# Patient Record
Sex: MALE | Race: WHITE | NOT HISPANIC OR LATINO | URBAN - METROPOLITAN AREA
[De-identification: names, ages, dates, MRNs, and addresses within clinical notes are randomized per-mention and may not be internally consistent; named-entity substitution may affect disease eponyms.]

---

## 2017-09-15 ENCOUNTER — OUTPATIENT (OUTPATIENT)
Dept: OUTPATIENT SERVICES | Facility: HOSPITAL | Age: 72
LOS: 1 days | End: 2017-09-15

## 2017-09-29 ENCOUNTER — OUTPATIENT (OUTPATIENT)
Dept: OUTPATIENT SERVICES | Facility: HOSPITAL | Age: 72
LOS: 1 days | End: 2017-09-29

## 2017-09-29 ENCOUNTER — INPATIENT (INPATIENT)
Facility: HOSPITAL | Age: 72
LOS: 2 days | Discharge: NURSING, REHAB OR LONG-TERM CARE | End: 2017-10-02
Payer: MEDICARE

## 2017-09-29 PROCEDURE — 73560 X-RAY EXAM OF KNEE 1 OR 2: CPT | Mod: 26,LT

## 2017-09-30 ENCOUNTER — OUTPATIENT (OUTPATIENT)
Dept: OUTPATIENT SERVICES | Facility: HOSPITAL | Age: 72
LOS: 1 days | End: 2017-09-30

## 2017-10-01 ENCOUNTER — OUTPATIENT (OUTPATIENT)
Dept: OUTPATIENT SERVICES | Facility: HOSPITAL | Age: 72
LOS: 1 days | End: 2017-10-01

## 2017-10-02 ENCOUNTER — OUTPATIENT (OUTPATIENT)
Dept: OUTPATIENT SERVICES | Facility: HOSPITAL | Age: 72
LOS: 1 days | End: 2017-10-02

## 2017-10-02 ENCOUNTER — INPATIENT (INPATIENT)
Facility: HOSPITAL | Age: 72
LOS: 2 days | Discharge: HOME | End: 2017-10-05

## 2017-10-09 ENCOUNTER — OUTPATIENT (OUTPATIENT)
Dept: OUTPATIENT SERVICES | Facility: HOSPITAL | Age: 72
LOS: 1 days | Discharge: HOME | End: 2017-10-09

## 2019-05-02 ENCOUNTER — OUTPATIENT (OUTPATIENT)
Dept: OUTPATIENT SERVICES | Facility: HOSPITAL | Age: 74
LOS: 1 days | End: 2019-05-02

## 2019-06-18 ENCOUNTER — OUTPATIENT (OUTPATIENT)
Dept: OUTPATIENT SERVICES | Facility: HOSPITAL | Age: 74
LOS: 1 days | End: 2019-06-18

## 2019-06-28 ENCOUNTER — OUTPATIENT (OUTPATIENT)
Dept: OUTPATIENT SERVICES | Facility: HOSPITAL | Age: 74
LOS: 1 days | End: 2019-06-28

## 2022-09-09 PROBLEM — Z00.00 ENCOUNTER FOR PREVENTIVE HEALTH EXAMINATION: Status: ACTIVE | Noted: 2022-09-09

## 2022-09-11 ENCOUNTER — APPOINTMENT (OUTPATIENT)
Dept: ORTHOPEDIC SURGERY | Facility: CLINIC | Age: 77
End: 2022-09-11

## 2022-09-13 ENCOUNTER — APPOINTMENT (OUTPATIENT)
Dept: ORTHOPEDIC SURGERY | Facility: CLINIC | Age: 77
End: 2022-09-13

## 2022-09-13 VITALS — BODY MASS INDEX: 30.52 KG/M2 | HEIGHT: 71 IN | WEIGHT: 218 LBS

## 2022-09-13 DIAGNOSIS — Z78.9 OTHER SPECIFIED HEALTH STATUS: ICD-10-CM

## 2022-09-13 DIAGNOSIS — M16.11 UNILATERAL PRIMARY OSTEOARTHRITIS, RIGHT HIP: ICD-10-CM

## 2022-09-13 DIAGNOSIS — S70.01XA CONTUSION OF RIGHT HIP, INITIAL ENCOUNTER: ICD-10-CM

## 2022-09-13 PROCEDURE — 99204 OFFICE O/P NEW MOD 45 MIN: CPT

## 2022-09-13 NOTE — DATA REVIEWED
[CT Scan] : CT scan [Right] : of the right [Hip] : hip [Report was reviewed and noted in the chart] : The report was reviewed and noted in the chart [I independently reviewed and interpreted images and report] : I independently reviewed and interpreted images and report [[email or web address]] : Severe degenerative changes of the right hip with associated joint effusion. No\par evidence for acute displaced fractures.\par \par Soft tissue edema.\par Severe degenerative changes of the right hip with associated joint effusion. No\par evidence for acute displaced fractures.\par \par Soft tissue edema.\par

## 2022-09-13 NOTE — IMAGING
[de-identified] : The patient is a well appearing 77 year.\par \par Patient ambulates with an antalgic gait. \par \par General: in no acute distress, seated comfortably, moving easily\par Skin: No discoloration, rashes; on palpation skin is dry, \par Neuro: Normal sensation all dermatomes, motor all myotomes\par Vascular: Normal pulses, no edema, normal temperature\par Coordination and balance: Normal\par Psych: normal mood and affect, non pressured speech, alert and oriented x3\par \par Pelvis: \par \par Symphysis pubis: Stable, no tenderness to palpation \par Palpable Hernias/Masses: None \par Resisted Sit Up: Negative \par \par Right Hip/Groin/Thigh: \par ROM: \par     Flexion: 0-120 degrees \par     Extension: 0-10 degrees \par     ABduction: 0-40 degrees \par     Adduction: 0-40 degrees \par     External Rotation: 0-40 degrees \par     Internal Rotation: 0-35 degrees \par PROVOCATIVE TESTING: \par      ARLYN TST: Negative \par      KHANG'S TST: Negative \par      PIRIFORMIS TST: Negative \par      Straight Leg Raise:  Negative \par      Seated Straight Leg Raise: Negative \par      IMPINGEMENT TST: Negative \par      Resisted ADduction : Negative \par \par PALPATION: \par         ASIS: Nontender \par         AIIS: Nontender \par         Greater Trochanter/IT-Band: Nontender \par         Illiac Crest: Nontender \par         Ischial Tuberosity: TTP \par         Hip Flexor: Nontender \par         Quadriceps: Nontender \par         Proximal Hamstring Origin: TTP \par         Proximal Hamstring Muscle-Tendon Junction: TTP \par         Hamstring Muscle Belly: Nontender \par         ADductor :  Nontender  \par         Lower Rectus Abdominis:  Nontender \par INSPECTION: \par         Deformity: No  \par         Erythema: No \par         Ecchymosis: Moderate\par         Abrasions: No \par         Effusion: No \par         Groin mass/bulge: No \par        Palpable Hernia: No \par NEUROLOGIC EXAM: \par         Sensation L2-S1: Grossly Intact \par MOTOR EXAM: \par         Quadriceps: 5 out of 5 \par         Hamstrings: 5 out of 5 \par         ABduction: 5 out of 5 \par         ADduction: 5 out of 5 \par         Hip Flexion: 5 out of 5 \par         TA: 5 out of 5 \par         GS: 5 out of 5 \par Circulatory/Pulses: \par         Dorsalis Pedis:      2+ \par         Posterior Tibialis: 2+ \par  \par Left Hip/Groin/Thigh: \par ROM: \par     Flexion: 0-120 degrees \par     Extension: 0-10 degrees \par     ABduction: 0-40 degrees \par     Adduction: 0-40 degrees \par     External Rotation: 0-40 degrees \par     Internal Rotation: 0-35 degrees \par PROVOCATIVE TESTING: \par      ARLYN TST: Negative \par      KHANG'S TST: Negative \par      PIRIFORMIS TST: Negative \par      Straight Leg Raise:  Negative \par      Seated Straight Leg Raise: Negative \par      IMPINGEMENT TST: Negative \par      Resisted ADduction : Negative \par PALPATION: \par         ASIS: Nontender \par         AIIS: Nontender \par         Greater Trochanter/IT-Band: TTP\par         Illiac Crest: Nontender \par         Ischial Tuberosity: Nontender \par         Hip Flexor: Nontender \par         Quadriceps: Nontender \par         Proximal Hamstring Origin: Nontender \par         Proximal Hamstring Muscle-Tendon Junction: Nontender \par         Hamstring Muscle Belly: Nontender \par         ADductor :  Nontender  \par         Lower Rectus Abdominis:  Nontender \par INSPECTION: \par         Deformity: No  \par         Erythema: No \par         Ecchymosis: No \par         Abrasions: No \par         Effusion: No \par         Groin mass/bulge: No \par        Palpable Hernia: No \par NEUROLOGIC EXAM: \par         Sensation L2-S1: Grossly Intact \par MOTOR EXAM: \par         Quadriceps: 5 out of 5 \par         Hamstrings: 5 out of 5 \par         ABduction: 5 out of 5 \par         ADduction: 5 out of 5 \par         Hip Flexion: 5 out of 5 \par         TA: 5 out of 5 \par         GS: 5 out of 5 \par Circulatory/Pulses: \par         Dorsalis Pedis:      2+ \par         Posterior Tibialis: 2+

## 2022-09-13 NOTE — HISTORY OF PRESENT ILLNESS
[de-identified] : The patient is a 77 year old LT hand dominant male who presents today complaining of RT hip pain\par Date of Injury/Onset:  8/27/22\par Pain:    At Rest: 2-3/10 \par With Activity:  9/10 \par Mechanism of injury: Trip and fall while vacuuming the pool \par Quality of symptoms:  Dull.Aching \par Improves with: Cold compress \par Worse with: Overuse \par Prior treatment: [facility] MD \par Prior Imaging: XR, CD not available \par School/Sport/Position/Occupation: retired\par Additional Information: None\par  \par Sep 13, 2022 \par \par CAROL is here today as a new patient for RT hip pain. On 8/27/2022, he fell on RT hip next to a pool. The next morning he had difficulty Seen at urgent care, RT hip xr performed, does not have copy of images today. He was sent for a CT RT hip, performed at [facility]. He denies being on anticoagulants. He states pain has improved since DOI, feels like limping has decreased.

## 2022-09-13 NOTE — DISCUSSION/SUMMARY
[de-identified] : Patient and I discussed their symptoms, RTT hip pain. Discussed findings of today's exam and possible causes of patient's pain. Educated patient on their most probable diagnosis of OA and contusion of RT hip. Reviewed possible courses of treatment, and we collaboratively decided best course of treatment at this time will include:\par \par 1. PT \par 2. Consider CSI if pain persists or worsens \par 3. OTC NSAIDs prn  \par \par Instructions: Dx / Natural History\par The patient was advised of the diagnosis.  The natural history of the pathology was explained in full to the patient in layman's terms.  Several different treatment options were discussed and explained in full to the patient including the risks and benefits of both surgical and non-surgical treatments.  All questions and concerns were answered. \par \par RICE\par I explained to the patient that rest, ice, compression, and elevation would benefit them.  They may return to activity after follow-up or when they no longer have any pain.\par \par NSAIDs - OTC\par Patient is to begin over the counter oral anti-inflammatory medications on an as needed basis, as long as there are no medical contraindications.  Patient is counseled on possible GI and blood pressure side effects.\par \par Pain Guide Activities\par The patient was advised to let pain guide the gradual advancement of activities.\par \par Icing\par The patient was advised to apply ice (wrapped in a towel or protective covering) to the area daily (20 minutes at a time, 2-4X/day).\par \par Follow up in [4-6] weeks.\par \par All of the patient's questions were answered to His satisfaction. Diagnoses and potential treatments were reviewed. He agreed with the plan and would like to move forward with it. \par \par History, physical exam, imaging, assessment and plan documented by Clifford Shea. The documentation recorded by the scribe accurately reflects the service I, Massimo Briscoe MD, personally performed and the decisions made by me.

## 2023-05-16 ENCOUNTER — APPOINTMENT (OUTPATIENT)
Dept: ORTHOPEDIC SURGERY | Facility: CLINIC | Age: 78
End: 2023-05-16